# Patient Record
Sex: FEMALE | URBAN - METROPOLITAN AREA
[De-identification: names, ages, dates, MRNs, and addresses within clinical notes are randomized per-mention and may not be internally consistent; named-entity substitution may affect disease eponyms.]

---

## 2019-04-08 ENCOUNTER — HOSPITAL ENCOUNTER (EMERGENCY)
Age: 39
Discharge: LEFT BEFORE DISCHARGE | End: 2019-04-08

## 2019-04-08 VITALS
RESPIRATION RATE: 18 BRPM | TEMPERATURE: 98.2 F | DIASTOLIC BLOOD PRESSURE: 61 MMHG | OXYGEN SATURATION: 97 % | SYSTOLIC BLOOD PRESSURE: 94 MMHG | HEART RATE: 70 BPM

## 2019-04-08 PROCEDURE — 10003627 HB COUNTER ED NO SERVICE

## 2019-04-08 ASSESSMENT — PAIN SCALES - GENERAL: PAINLEVEL_OUTOF10: 7

## 2023-04-12 ENCOUNTER — TELEPHONE (OUTPATIENT)
Dept: OBGYN | Age: 43
End: 2023-04-12

## 2023-04-24 ENCOUNTER — HOSPITAL ENCOUNTER (EMERGENCY)
Age: 43
Discharge: HOME | End: 2023-04-24
Attending: EMERGENCY MEDICINE

## 2023-04-24 VITALS
SYSTOLIC BLOOD PRESSURE: 91 MMHG | OXYGEN SATURATION: 96 % | TEMPERATURE: 99.1 F | DIASTOLIC BLOOD PRESSURE: 52 MMHG | WEIGHT: 145 LBS | RESPIRATION RATE: 14 BRPM | HEART RATE: 50 BPM

## 2023-04-24 DIAGNOSIS — O20.9 BLEEDING IN EARLY PREGNANCY: Primary | ICD-10-CM

## 2023-04-24 LAB
ABO/RH: NORMAL
ABSOLUTE EOS #: 0.13 K/UL (ref 0–0.44)
ABSOLUTE IMMATURE GRANULOCYTE: <0.03 K/UL (ref 0–0.3)
ABSOLUTE LYMPH #: 4.57 K/UL (ref 1.1–3.7)
ABSOLUTE MONO #: 0.58 K/UL (ref 0.1–1.2)
ANION GAP SERPL CALCULATED.3IONS-SCNC: 11 MMOL/L (ref 9–17)
ANTIBODY SCREEN: NEGATIVE
BACTERIA: ABNORMAL
BASOPHILS # BLD: 1 % (ref 0–2)
BASOPHILS ABSOLUTE: 0.04 K/UL (ref 0–0.2)
BILIRUBIN URINE: NEGATIVE
BUN SERPL-MCNC: 6 MG/DL (ref 6–20)
C TRACH DNA SPEC QL PROBE+SIG AMP: NEGATIVE
CALCIUM SERPL-MCNC: 9.5 MG/DL (ref 8.6–10.4)
CANDIDA SPECIES, DNA PROBE: NEGATIVE
CASTS UA: ABNORMAL /LPF (ref 0–8)
CHLORIDE SERPL-SCNC: 103 MMOL/L (ref 98–107)
CO2 SERPL-SCNC: 21 MMOL/L (ref 20–31)
COLOR: YELLOW
CREAT SERPL-MCNC: 0.51 MG/DL (ref 0.5–0.9)
EOSINOPHILS RELATIVE PERCENT: 2 % (ref 1–4)
EPITHELIAL CELLS UA: ABNORMAL /HPF (ref 0–5)
GARDNERELLA VAGINALIS, DNA PROBE: POSITIVE
GFR SERPL CREATININE-BSD FRML MDRD: >60 ML/MIN/1.73M2
GLUCOSE SERPL-MCNC: 83 MG/DL (ref 70–99)
GLUCOSE UR STRIP.AUTO-MCNC: NEGATIVE MG/DL
HCG QUANTITATIVE: ABNORMAL MIU/ML
HCT VFR BLD AUTO: 41.5 % (ref 36.3–47.1)
HGB BLD-MCNC: 14.2 G/DL (ref 11.9–15.1)
IMMATURE GRANULOCYTES: 0 %
KETONES UR STRIP.AUTO-MCNC: ABNORMAL MG/DL
LEUKOCYTE ESTERASE UR QL STRIP.AUTO: NEGATIVE
LYMPHOCYTES # BLD: 52 % (ref 24–43)
MCH RBC QN AUTO: 31 PG (ref 25.2–33.5)
MCHC RBC AUTO-ENTMCNC: 34.2 G/DL (ref 28.4–34.8)
MCV RBC AUTO: 90.6 FL (ref 82.6–102.9)
MONOCYTES # BLD: 7 % (ref 3–12)
N GONORRHOEA DNA SPEC QL PROBE+SIG AMP: NEGATIVE
NITRITE UR QL STRIP.AUTO: NEGATIVE
NRBC AUTOMATED: 0 PER 100 WBC
PDW BLD-RTO: 13.6 % (ref 11.8–14.4)
PLATELET # BLD AUTO: 219 K/UL (ref 138–453)
PMV BLD AUTO: 12.5 FL (ref 8.1–13.5)
POTASSIUM SERPL-SCNC: 3.4 MMOL/L (ref 3.7–5.3)
PROT UR STRIP.AUTO-MCNC: 6 MG/DL (ref 5–8)
PROT UR STRIP.AUTO-MCNC: NEGATIVE MG/DL
RBC # BLD: 4.58 M/UL (ref 3.95–5.11)
RBC CLUMPS #/AREA URNS AUTO: ABNORMAL /HPF (ref 0–4)
SEG NEUTROPHILS: 38 % (ref 36–65)
SEGMENTED NEUTROPHILS ABSOLUTE COUNT: 3.24 K/UL (ref 1.5–8.1)
SODIUM SERPL-SCNC: 135 MMOL/L (ref 135–144)
SOURCE: ABNORMAL
SPECIFIC GRAVITY UA: 1.01 (ref 1–1.03)
SPECIMEN DESCRIPTION: NORMAL
TRICHOMONAS VAGINALIS DNA: NEGATIVE
TURBIDITY: CLEAR
URINE HGB: NEGATIVE
UROBILINOGEN, URINE: NORMAL
WBC # BLD AUTO: 8.6 K/UL (ref 3.5–11.3)
WBC UA: ABNORMAL /HPF (ref 0–5)

## 2023-04-24 PROCEDURE — 2580000003 HC RX 258: Performed by: STUDENT IN AN ORGANIZED HEALTH CARE EDUCATION/TRAINING PROGRAM

## 2023-04-24 PROCEDURE — 87480 CANDIDA DNA DIR PROBE: CPT

## 2023-04-24 PROCEDURE — 81001 URINALYSIS AUTO W/SCOPE: CPT

## 2023-04-24 PROCEDURE — 84702 CHORIONIC GONADOTROPIN TEST: CPT

## 2023-04-24 PROCEDURE — 86900 BLOOD TYPING SEROLOGIC ABO: CPT

## 2023-04-24 PROCEDURE — 86901 BLOOD TYPING SEROLOGIC RH(D): CPT

## 2023-04-24 PROCEDURE — 96374 THER/PROPH/DIAG INJ IV PUSH: CPT

## 2023-04-24 PROCEDURE — 99284 EMERGENCY DEPT VISIT MOD MDM: CPT

## 2023-04-24 PROCEDURE — 96376 TX/PRO/DX INJ SAME DRUG ADON: CPT

## 2023-04-24 PROCEDURE — 6360000002 HC RX W HCPCS: Performed by: STUDENT IN AN ORGANIZED HEALTH CARE EDUCATION/TRAINING PROGRAM

## 2023-04-24 PROCEDURE — 80048 BASIC METABOLIC PNL TOTAL CA: CPT

## 2023-04-24 PROCEDURE — 87077 CULTURE AEROBIC IDENTIFY: CPT

## 2023-04-24 PROCEDURE — 6370000000 HC RX 637 (ALT 250 FOR IP): Performed by: STUDENT IN AN ORGANIZED HEALTH CARE EDUCATION/TRAINING PROGRAM

## 2023-04-24 PROCEDURE — 87186 SC STD MICRODIL/AGAR DIL: CPT

## 2023-04-24 PROCEDURE — 96372 THER/PROPH/DIAG INJ SC/IM: CPT

## 2023-04-24 PROCEDURE — 86850 RBC ANTIBODY SCREEN: CPT

## 2023-04-24 PROCEDURE — 87660 TRICHOMONAS VAGIN DIR PROBE: CPT

## 2023-04-24 PROCEDURE — 87086 URINE CULTURE/COLONY COUNT: CPT

## 2023-04-24 PROCEDURE — 87510 GARDNER VAG DNA DIR PROBE: CPT

## 2023-04-24 PROCEDURE — 87491 CHLMYD TRACH DNA AMP PROBE: CPT

## 2023-04-24 PROCEDURE — 85025 COMPLETE CBC W/AUTO DIFF WBC: CPT

## 2023-04-24 PROCEDURE — 87591 N.GONORRHOEAE DNA AMP PROB: CPT

## 2023-04-24 RX ORDER — ONDANSETRON 2 MG/ML
4 INJECTION INTRAMUSCULAR; INTRAVENOUS ONCE
Status: COMPLETED | OUTPATIENT
Start: 2023-04-24 | End: 2023-04-24

## 2023-04-24 RX ORDER — 0.9 % SODIUM CHLORIDE 0.9 %
1000 INTRAVENOUS SOLUTION INTRAVENOUS ONCE
Status: COMPLETED | OUTPATIENT
Start: 2023-04-24 | End: 2023-04-24

## 2023-04-24 RX ORDER — ONDANSETRON 4 MG/1
4 TABLET, ORALLY DISINTEGRATING ORAL 3 TIMES DAILY PRN
Qty: 21 TABLET | Refills: 0 | Status: SHIPPED | OUTPATIENT
Start: 2023-04-24

## 2023-04-24 RX ORDER — CEPHALEXIN 500 MG/1
500 CAPSULE ORAL ONCE
Status: COMPLETED | OUTPATIENT
Start: 2023-04-24 | End: 2023-04-24

## 2023-04-24 RX ORDER — ACETAMINOPHEN 500 MG
1000 TABLET ORAL ONCE
Status: COMPLETED | OUTPATIENT
Start: 2023-04-24 | End: 2023-04-24

## 2023-04-24 RX ORDER — METRONIDAZOLE 7.5 MG/G
GEL VAGINAL NIGHTLY
Qty: 70 G | Refills: 0 | Status: SHIPPED | OUTPATIENT
Start: 2023-04-24 | End: 2023-04-29

## 2023-04-24 RX ORDER — CEPHALEXIN 500 MG/1
500 CAPSULE ORAL 4 TIMES DAILY
Qty: 28 CAPSULE | Refills: 0 | Status: SHIPPED | OUTPATIENT
Start: 2023-04-24 | End: 2023-05-01

## 2023-04-24 RX ORDER — METRONIDAZOLE 7.5 MG/G
GEL VAGINAL
Qty: 70 G | Refills: 0 | Status: SHIPPED | OUTPATIENT
Start: 2023-04-24 | End: 2023-04-24 | Stop reason: SDUPTHER

## 2023-04-24 RX ORDER — POTASSIUM CHLORIDE 20 MEQ/1
40 TABLET, EXTENDED RELEASE ORAL ONCE
Status: COMPLETED | OUTPATIENT
Start: 2023-04-24 | End: 2023-04-24

## 2023-04-24 RX ADMIN — HUMAN RHO(D) IMMUNE GLOBULIN 300 MCG: 300 INJECTION, SOLUTION INTRAMUSCULAR at 07:48

## 2023-04-24 RX ADMIN — ONDANSETRON 4 MG: 2 INJECTION INTRAMUSCULAR; INTRAVENOUS at 04:07

## 2023-04-24 RX ADMIN — POTASSIUM CHLORIDE 40 MEQ: 1500 TABLET, EXTENDED RELEASE ORAL at 05:49

## 2023-04-24 RX ADMIN — CEPHALEXIN 500 MG: 500 CAPSULE ORAL at 05:48

## 2023-04-24 RX ADMIN — ONDANSETRON 4 MG: 2 INJECTION INTRAMUSCULAR; INTRAVENOUS at 02:54

## 2023-04-24 RX ADMIN — ACETAMINOPHEN 1000 MG: 500 TABLET ORAL at 03:53

## 2023-04-24 RX ADMIN — SODIUM CHLORIDE 1000 ML: 9 INJECTION, SOLUTION INTRAVENOUS at 02:53

## 2023-04-24 ASSESSMENT — ENCOUNTER SYMPTOMS
VOMITING: 0
ABDOMINAL PAIN: 0
SHORTNESS OF BREATH: 0
NAUSEA: 0
COUGH: 0
CHEST TIGHTNESS: 0
WHEEZING: 0
DIARRHEA: 0

## 2023-04-24 ASSESSMENT — PAIN - FUNCTIONAL ASSESSMENT
PAIN_FUNCTIONAL_ASSESSMENT: 0-10
PAIN_FUNCTIONAL_ASSESSMENT: 0-10

## 2023-04-24 ASSESSMENT — PAIN DESCRIPTION - FREQUENCY: FREQUENCY: CONTINUOUS

## 2023-04-24 ASSESSMENT — PAIN SCALES - GENERAL
PAINLEVEL_OUTOF10: 7

## 2023-04-24 ASSESSMENT — PAIN DESCRIPTION - DESCRIPTORS
DESCRIPTORS: CRAMPING
DESCRIPTORS: CRAMPING

## 2023-04-24 ASSESSMENT — PAIN DESCRIPTION - PAIN TYPE: TYPE: ACUTE PAIN

## 2023-04-24 ASSESSMENT — PAIN DESCRIPTION - LOCATION
LOCATION: ABDOMEN
LOCATION: ABDOMEN

## 2023-04-24 NOTE — CONSULTS
[facility]    Patient Name: Patrizia Camara     Patient : 1980  Room/Bed:   Admission Date/Time: 2023 12:53 AM  Primary Care Physician: No primary care provider on file. Consulting Provider: Fletcher Gottron  Reason for Consult: vaginal bleeding, O neg    CC:   Chief Complaint   Patient presents with    Abdominal Pain     Abdominal cramping started this evening, denies vaginal bleeding              ED team requested a telephone consult only. Patient is Rh negative and presenting with vaginal spotting. Pelvic exam per attending unremarkable with closed external os. LBUS showed IUP with heart tones of 185 bpm. Per verbal report, patient had a dating US completed at [facility], image not available in Epic at this time. Recommended Rhogam for negative Rh status. Defer treatment of Vaginitis and UA to ED team.     Follow up information placed for patient to initiate OB care at the [facility] clinic. Plan discussed with Dr. Danii Arceo, who is agreeable.      Shefali Garcia MD  Ob/Gyn Resident   [place] [address]  2023, 5:21 AM

## 2023-04-24 NOTE — ED NOTES
Report given to [facility]  No change in patient status  Continues to rest quietly       Debbie Benitez [facility]  04/24/23 5070

## 2023-04-24 NOTE — ED NOTES
Patient verbalizes feels chilled  Temp rechecked 99.1 po  Patient also c/o headache, will inform Dr. Hong November   remains at bedside     Tatyana Barron, [facility]  04/24/23 5949

## 2023-04-24 NOTE — ED NOTES
Report given to [facility]; all questions addressed.      Neeraj Duran, [place]  04/24/23 8625

## 2023-04-24 NOTE — DISCHARGE INSTRUCTIONS
You have been seen in the emergency department due to concern for vaginal bleeding during pregnancy. At this time your pregnancy appears to be viable. It is recommended that you return to the emergency department if you have any worsening abdominal pain, continued bleeding, fevers, pain when you urinate, or any other urgent medical concerns. Your case was reviewed with obstetrics and gynecology-they would recommend that you follow-up with any OB care provider as soon as possible. They have attached their phone number in your discharge paperwork. Please contact them to discuss arranging a visit-they may be able to assist with insurance.

## 2023-04-24 NOTE — ED TRIAGE NOTES
Patient c/o abdominal cramping that started earlier this evening  Denies vaginal bleeding  Patient is tearful  Pain is 7/10   Will continue to monitor

## 2023-04-24 NOTE — ED PROVIDER NOTES
[facility]  ED  Emergency Department Encounter  Emergency Medicine Resident     Pt Zehra Ramirez  MRN: [id number]  [place] 1980  Date of evaluation: 23  PCP:  No primary care provider on file. Note Started: 1:01 AM EDT      CHIEF COMPLAINT       Chief Complaint   Patient presents with    Abdominal Pain     Abdominal cramping started this evening, denies vaginal bleeding       HISTORY OF PRESENT ILLNESS  (Location/Symptom, Timing/Onset, Context/Setting, Quality, Duration, Modifying Factors, Severity.)      Ángel Slaughter is a 43 y.o. female who presents with concern for miscarriage. This patient is , approximately 8 weeks gestational age, LMP 2023. She has not obtained prenatal care yet due to lack of insurance. They did undergo ultrasound testing on  with [facility], and her heart rate was confirmed at that time. Patient reports that she has had bleeding for about 1 day. She reports small droplets of blood coming from her vagina. She has not been soaking through any pads. She is not having any passage of clots. She reports that she is also having some right lower quadrant discomfort. There is also associated nausea and vomiting for the past 2 weeks which she has been taking malathi supplement for with limited relief. She does report associated diarrhea, 2-3 times daily. She has been taking prenatal vitamins. PAST MEDICAL / SURGICAL / SOCIAL / FAMILY HISTORY      has no past medical history on file. has no past surgical history on file.       Social History     Socioeconomic History    Marital status:      Spouse name: Not on file    Number of children: Not on file    Years of education: Not on file    Highest education level: Not on file   Occupational History    Not on file   Tobacco Use    Smoking status: Never    Smokeless tobacco: Never   Substance and Sexual Activity    Alcohol use: Never    Drug use: Not on file    Sexual

## 2023-04-24 NOTE — ED NOTES
Dr. Madisyn Presley notified of patient's continued hypotensive and bradycardic readings.       Odilia Mitchell RN  04/24/23 3253

## 2023-04-24 NOTE — ED PROVIDER NOTES
[facility] ED  Emergency Department  Emergency Medicine Resident [address] of [place] was assumed from Dr. Shadi Stiles and is being seen for Abdominal Pain (Abdominal cramping started this evening, denies vaginal bleeding)  . The patient's initial evaluation and plan have been discussed with the prior provider who initially evaluated the patient. EMERGENCY DEPARTMENT COURSE / MEDICAL DECISION MAKING:       MEDICATIONS GIVEN:  Orders Placed This Encounter   Medications    0.9 % sodium chloride bolus    ondansetron (ZOFRAN) injection 4 mg    acetaminophen (TYLENOL) tablet 1,000 mg    potassium chloride (KLOR-CON M) extended release tablet 40 mEq    ondansetron (ZOFRAN) injection 4 mg    DISCONTD: rho(D) immune globulin (HYPERRHO S/D) injection 300 mcg    cephALEXin (KEFLEX) 500 MG capsule     Sig: Take 1 capsule by mouth 4 times daily for 7 days     Dispense:  28 capsule     Refill:  0    ondansetron (ZOFRAN-ODT) 4 MG disintegrating tablet     Sig: Take 1 tablet by mouth 3 times daily as needed for Nausea or Vomiting     Dispense:  21 tablet     Refill:  0    cephALEXin (KEFLEX) capsule 500 mg     Order Specific Question:   Antimicrobial Indications     Answer:   Urinary Tract Infection    DISCONTD: metroNIDAZOLE (METROGEL VAGINAL) 0.75 % vaginal gel     Sig: Place vaginally 2 times daily for 7 days.      Dispense:  70 g     Refill:  0    metroNIDAZOLE (METROGEL VAGINAL) 0.75 % vaginal gel     Sig: Place vaginally at bedtime for 5 days     Dispense:  70 g     Refill:  0    rho(D) immune globulin (HYPERRHO S/D) injection 300 mcg       LABS / RADIOLOGY:     Labs Reviewed   VAGINITIS DNA PROBE - Abnormal; Notable for the following components:       Result Value    Gardnerella Vaginalis, DNA Probe POSITIVE (*)     All other components within normal limits   HCG, QUANTITATIVE, PREGNANCY - Abnormal; Notable for the following components:    hCG ,751 (*)     All other components

## 2023-04-24 NOTE — ED NOTES
The following labs were labeled with appropriate pt sticker and tubed to lab:     [] Blue     [] Lavender   [] on ice  [] Green/yellow  [] Green/black [] on ice  [] Deirdre Josiah  [] on ice  [] Yellow  [] Red  [] Type/ Screen  [] ABG  [] VBG    [] COVID-19 swab    [] Rapid  [] PCR  [] Flu swab  [] Peds Viral Panel     [x] Urine Sample  [] Fecal Sample  [] Pelvic Cultures  [] Blood Cultures  [] X 2  [] STREP Cultures       Hernán Paulino RN  04/24/23 8469

## 2023-04-24 NOTE — ED PROVIDER NOTES
[facility] HANDOFF       Handoff taken on the following patient from prior Attending Physician:  Pt Name: [place] Me[place]  PCP:  No primary care provider on file. Attestation  I was available and discussed any additional care issues that arose and coordinated the management plans with the resident(s) caring for the patient during my duty period. Any areas of disagreement with resident's documentation of care or procedures are noted on the chart. I was personally present for the key portions of any/all procedures during my duty period. I have documented in the chart those procedures where I was not present during the key portions. CHIEF COMPLAINT       Chief Complaint   Patient presents with    Abdominal Pain     Abdominal cramping started this evening, denies vaginal bleeding         CURRENT MEDICATIONS     Previous Medications  Previous Medications    FOLIC ACID PO    Take by mouth    PRENATAL VIT-DSS-FE CBN-FA (PRENATAL AD PO)    Take by mouth       Encounter Medications  Orders Placed This Encounter   Medications    0.9 % sodium chloride bolus    ondansetron (ZOFRAN) injection 4 mg    acetaminophen (TYLENOL) tablet 1,000 mg    potassium chloride (KLOR-CON M) extended release tablet 40 mEq    ondansetron (ZOFRAN) injection 4 mg    DISCONTD: rho(D) immune globulin (HYPERRHO S/D) injection 300 mcg    cephALEXin (KEFLEX) 500 MG capsule     Sig: Take 1 capsule by mouth 4 times daily for 7 days     Dispense:  28 capsule     Refill:  0    ondansetron (ZOFRAN-ODT) 4 MG disintegrating tablet     Sig: Take 1 tablet by mouth 3 times daily as needed for Nausea or Vomiting     Dispense:  21 tablet     Refill:  0    cephALEXin (KEFLEX) capsule 500 mg     Order Specific Question:   Antimicrobial Indications     Answer:   Urinary Tract Infection    DISCONTD: metroNIDAZOLE (METROGEL VAGINAL) 0.75 % vaginal gel     Sig: Place vaginally 2 times daily for 7 days.      Dispense:  70 g     Refill:

## 2023-04-24 NOTE — ED PROVIDER NOTES
[address] [facility]   Emergency Department  Faculty Attestation       I performed a history and physical examination of the patient and discussed management with the resident. I reviewed the residents note and agree with the documented findings including all diagnostic interpretations and plan of care. Any areas of disagreement are noted on the chart. I was personally present for the key portions of any procedures. I have documented in the chart those procedures where I was not present during the key portions. I have reviewed the emergency nurses triage note. I agree with the chief complaint, past medical history, past surgical history, allergies, medications, social and family history as documented unless otherwise noted below. For Physician Assistant/ Nurse Practitioner cases/documentation I have personally evaluated this patient and have completed at least one if not all key elements of the E/M (history, physical exam, and MDM). Additional findings are as noted. Patient Name: Karol Bravo  MRN: [id number]  : 1980  Primary Care Physician: No primary care provider on file. Date of evaluationa: 2023   Note Started: 1:27 AM EDT    Pertinent Comments     Chief Complaint: No chief complaint on file. Initial vitals: (If not listed, please see nursing documentation)  ED Triage Vitals     ED Triage Vitals   BP Temp Temp Source Heart Rate Resp SpO2 Height Weight   23 0150 23 0150 23 0150 23 0150 23 0150 23 0150 -- 23 0128   99/64 98.4 °F (36.9 °C) Oral 55 16 98 %  145 lb (65.8 kg)         HPI/PE/Impression: This is a 43 y.o. female who presents to the Emergency Department concern for possible miscarriage, abdominal pain, diarrhea, intermittent vaginal bleeding, and ear pain. Had a [facility] ultrasound with confirmation but we cannot see patient's imaging. On exam patient is awake and alert. NC/AT.   Heart sounds regular and lungs clear

## 2023-04-24 NOTE — ED NOTES
The following labs were labeled with appropriate pt sticker and tubed to lab:     [] Blue     [x] Lavender   [] on ice  [x] Green/yellow  [] Green/black [] on ice  [] Jose Enrique Crate  [] on ice  [] Yellow  [] Red  [] Type/ Screen  [] ABG  [] VBG  Pink sent also   [] COVID-19 swab    [] Rapid  [] PCR  [] Flu swab  [] Peds Viral Panel     [] Urine Sample  [] Fecal Sample  [] Pelvic Cultures  [] Blood Cultures  [] X 2  [] STREP Cultures       Chelsie Faustin RN  04/24/23 9792

## 2023-04-24 NOTE — ED NOTES
Dr. Joan Cohen notified of patient's hypotensive/bradycardic readings.      Erick Arciniega RN  04/24/23 9517

## 2023-04-25 LAB
BLD PROD TYP BPU: NORMAL
MICROORGANISM SPEC CULT: ABNORMAL
SPECIMEN DESCRIPTION: ABNORMAL
STATUS OF UNITS: NORMAL
TRANSFUSION STATUS: NORMAL
UNIT DIVISION: 0
UNIT NUMBER: NORMAL

## 2023-04-26 NOTE — PROGRESS NOTES
Reviewed patient's urine culture - culture positive for staph epidermidis (CFU > 100,000). Patient was discharged on cephalexin, and culture is sensitive to prescribed medication. Antibiotic prescribed at discharge is appropriate - no changes made to antibiotic regimen.      Devin Kendall, PharmD   4/26/2023 2:21 PM